# Patient Record
Sex: MALE | Race: ASIAN | Employment: FULL TIME
[De-identification: names, ages, dates, MRNs, and addresses within clinical notes are randomized per-mention and may not be internally consistent; named-entity substitution may affect disease eponyms.]

---

## 2019-10-01 ENCOUNTER — HEALTH MAINTENANCE LETTER (OUTPATIENT)
Age: 49
End: 2019-10-01

## 2021-01-15 ENCOUNTER — HEALTH MAINTENANCE LETTER (OUTPATIENT)
Age: 51
End: 2021-01-15

## 2021-02-17 VITALS
OXYGEN SATURATION: 96 % | HEART RATE: 100 BPM | WEIGHT: 150 LBS | TEMPERATURE: 98 F | BODY MASS INDEX: 25.35 KG/M2 | RESPIRATION RATE: 18 BRPM | SYSTOLIC BLOOD PRESSURE: 134 MMHG | DIASTOLIC BLOOD PRESSURE: 106 MMHG

## 2021-02-17 PROCEDURE — 99283 EMERGENCY DEPT VISIT LOW MDM: CPT | Mod: 25

## 2021-02-17 PROCEDURE — 12002 RPR S/N/AX/GEN/TRNK2.6-7.5CM: CPT

## 2021-02-18 ENCOUNTER — HOSPITAL ENCOUNTER (EMERGENCY)
Facility: CLINIC | Age: 51
Discharge: HOME | End: 2021-02-18
Attending: EMERGENCY MEDICINE | Admitting: EMERGENCY MEDICINE
Payer: COMMERCIAL

## 2021-02-18 DIAGNOSIS — S61.019A LACERATION OF THUMB, FOREIGN BODY PRESENCE UNSPECIFIED, NAIL DAMAGE STATUS UNSPECIFIED, UNSPECIFIED LATERALITY, INITIAL ENCOUNTER: ICD-10-CM

## 2021-02-18 PROCEDURE — 250N000013 HC RX MED GY IP 250 OP 250 PS 637: Performed by: EMERGENCY MEDICINE

## 2021-02-18 PROCEDURE — 90715 TDAP VACCINE 7 YRS/> IM: CPT | Performed by: EMERGENCY MEDICINE

## 2021-02-18 PROCEDURE — 90471 IMMUNIZATION ADMIN: CPT | Performed by: EMERGENCY MEDICINE

## 2021-02-18 PROCEDURE — 250N000011 HC RX IP 250 OP 636: Performed by: EMERGENCY MEDICINE

## 2021-02-18 RX ORDER — LIDOCAINE HYDROCHLORIDE 10 MG/ML
INJECTION, SOLUTION INFILTRATION; PERINEURAL
Status: DISCONTINUED
Start: 2021-02-18 | End: 2021-02-18 | Stop reason: HOSPADM

## 2021-02-18 RX ORDER — CEPHALEXIN 500 MG/1
500 CAPSULE ORAL 4 TIMES DAILY
Qty: 20 CAPSULE | Refills: 0 | Status: SHIPPED | OUTPATIENT
Start: 2021-02-18 | End: 2021-02-23

## 2021-02-18 RX ORDER — CEPHALEXIN 500 MG/1
500 CAPSULE ORAL ONCE
Status: COMPLETED | OUTPATIENT
Start: 2021-02-18 | End: 2021-02-18

## 2021-02-18 RX ADMIN — CEPHALEXIN 500 MG: 500 CAPSULE ORAL at 01:41

## 2021-02-18 RX ADMIN — CLOSTRIDIUM TETANI TOXOID ANTIGEN (FORMALDEHYDE INACTIVATED), CORYNEBACTERIUM DIPHTHERIAE TOXOID ANTIGEN (FORMALDEHYDE INACTIVATED), BORDETELLA PERTUSSIS TOXOID ANTIGEN (GLUTARALDEHYDE INACTIVATED), BORDETELLA PERTUSSIS FILAMENTOUS HEMAGGLUTININ ANTIGEN (FORMALDEHYDE INACTIVATED), BORDETELLA PERTUSSIS PERTACTIN ANTIGEN, AND BORDETELLA PERTUSSIS FIMBRIAE 2/3 ANTIGEN 0.5 ML: 5; 2; 2.5; 5; 3; 5 INJECTION, SUSPENSION INTRAMUSCULAR at 01:41

## 2021-02-18 ASSESSMENT — ENCOUNTER SYMPTOMS: WOUND: 1

## 2021-02-18 NOTE — ED TRIAGE NOTES
Pt arrives with a laceration to right thumb after sharpening knives tonight.  Bleeding controlled.  Denies blood thinners but admits to ETOH tonight

## 2021-02-18 NOTE — ED PROVIDER NOTES
History   Chief Complaint:  Laceration     HPI   Medhat Condon is a 50 year old male with history of hyperlipidemia who presents with a laceration. The patient reports around 2300 he was sharpening his kitchen knives and sliced his right thumb. Tetanus last updated in 2004.     Review of Systems   Skin: Positive for wound.   All other systems reviewed and are negative.    Allergies:  Bee Venom  Penicillin G    Medications:  Vyvanse     Past Medical History:    External hemorrhoids  Internal hemorrhoids  Hyperlipidemia       Social History:  The patient arrived to the ED Alone via private car.    Physical Exam     Patient Vitals for the past 24 hrs:   BP Temp Temp src Pulse Resp SpO2 Weight   02/17/21 2350 (!) 134/106 98  F (36.7  C) Temporal 100 18 96 % 68 kg (150 lb)     Physical Exam    Constitutional:  Well appearing.  MSK:   Brisk cap refill of right thumb. Full ROM  Neuro:   Strength and sensation fully intact.  Derm:   3.5 cm horizontal through fat pad of right thumb. With no tendon or deep structure involvement.     Emergency Department Course     Procedures    Laceration Repair        LACERATION:  A simple clean 3.5 cm laceration.      LOCATION:  Right fat pad of thumb      FUNCTION:  Distally sensation, circulation, motor and tendon function are intact.      ANESTHESIA:  Local using 1% lidocaine without epinephrine total of 2 mLs      PREPARATION:  Irrigation with Normal Saline and Shur Clens      DEBRIDEMENT:  no debridement and wound explored, no foreign body found      CLOSURE:  Wound was closed with One Layer.  Skin closed with 3 x 4.0 Ethylon using interrupted sutures.    Emergency Department Course:  Reviewed:  0051: I reviewed the patient's nursing notes, vitals and past medical history    Assessments:  0101: I performed an exam of the patient as documented above.     0142: I repaired the laceration as documented above.     Interventions:  0141 Adacel 0.5 mL IM     0141 Keflex 500 mg  PO    Disposition:  The patient was discharged to home.     Impression & Plan         Medical Decision Making:  Findings and exam were consistent with laceration of Right thumb, which was repaired as noted above.  There does appear to be a tendon injury. There is no evidence at this time of associated fracture or foreign body, deep space infection,  or neurovascular injury. Call ASAP to schedule follow up with orthopedics. The sutures will likely be removed after follow up with ortho, otherwise suture removal in 10-14 days. Indications for immediate return to ER/UR were reviewed and included but are not limited to, redness, fevers, drainage, increasing pain, high fevers, or other concerns. Prescriptions for Keflex were provided.    Diagnosis:    ICD-10-CM   1. Laceration of thumb, foreign body presence unspecified, nail damage status unspecified, unspecified laterality, initial encounter  S61.019A     Discharge Medications:  New Prescriptions    CEPHALEXIN (KEFLEX) 500 MG CAPSULE    Take 1 capsule (500 mg) by mouth 4 times daily for 5 days     Scribe Disclosure:  Prema NAVARRO, am serving as a scribe at 12:50 AM on 2/18/2021 to document services personally performed by Patrick Wright MD based on my observations and the provider's statements to me.      Patrick Wright MD  02/18/21 0411

## 2021-04-11 ENCOUNTER — HOSPITAL ENCOUNTER (EMERGENCY)
Facility: CLINIC | Age: 51
Discharge: HOME | End: 2021-04-11
Attending: PHYSICIAN ASSISTANT | Admitting: PHYSICIAN ASSISTANT
Payer: COMMERCIAL

## 2021-04-11 ENCOUNTER — APPOINTMENT (OUTPATIENT)
Dept: GENERAL RADIOLOGY | Facility: CLINIC | Age: 51
End: 2021-04-11
Attending: PHYSICIAN ASSISTANT
Payer: COMMERCIAL

## 2021-04-11 VITALS
RESPIRATION RATE: 16 BRPM | HEART RATE: 68 BPM | TEMPERATURE: 98.9 F | OXYGEN SATURATION: 98 % | DIASTOLIC BLOOD PRESSURE: 94 MMHG | SYSTOLIC BLOOD PRESSURE: 123 MMHG

## 2021-04-11 DIAGNOSIS — U07.1 2019 NOVEL CORONAVIRUS DISEASE (COVID-19): ICD-10-CM

## 2021-04-11 LAB
ANION GAP SERPL CALCULATED.3IONS-SCNC: 6 MMOL/L (ref 3–14)
BASOPHILS # BLD AUTO: 0 10E9/L (ref 0–0.2)
BASOPHILS NFR BLD AUTO: 0 %
BUN SERPL-MCNC: 14 MG/DL (ref 7–30)
CALCIUM SERPL-MCNC: 8.3 MG/DL (ref 8.5–10.1)
CHLORIDE SERPL-SCNC: 108 MMOL/L (ref 94–109)
CO2 SERPL-SCNC: 25 MMOL/L (ref 20–32)
CREAT SERPL-MCNC: 1 MG/DL (ref 0.66–1.25)
DIFFERENTIAL METHOD BLD: ABNORMAL
EOSINOPHIL # BLD AUTO: 0 10E9/L (ref 0–0.7)
EOSINOPHIL NFR BLD AUTO: 0 %
ERYTHROCYTE [DISTWIDTH] IN BLOOD BY AUTOMATED COUNT: 12.1 % (ref 10–15)
GFR SERPL CREATININE-BSD FRML MDRD: 88 ML/MIN/{1.73_M2}
GLUCOSE SERPL-MCNC: 85 MG/DL (ref 70–99)
HCT VFR BLD AUTO: 48.2 % (ref 40–53)
HGB BLD-MCNC: 16.8 G/DL (ref 13.3–17.7)
IMM GRANULOCYTES # BLD: 0 10E9/L (ref 0–0.4)
IMM GRANULOCYTES NFR BLD: 0.2 %
LYMPHOCYTES # BLD AUTO: 1 10E9/L (ref 0.8–5.3)
LYMPHOCYTES NFR BLD AUTO: 21.1 %
MCH RBC QN AUTO: 31.8 PG (ref 26.5–33)
MCHC RBC AUTO-ENTMCNC: 34.9 G/DL (ref 31.5–36.5)
MCV RBC AUTO: 91 FL (ref 78–100)
MONOCYTES # BLD AUTO: 0.3 10E9/L (ref 0–1.3)
MONOCYTES NFR BLD AUTO: 7 %
NEUTROPHILS # BLD AUTO: 3.3 10E9/L (ref 1.6–8.3)
NEUTROPHILS NFR BLD AUTO: 71.7 %
NRBC # BLD AUTO: 0 10*3/UL
NRBC BLD AUTO-RTO: 0 /100
PLATELET # BLD AUTO: 115 10E9/L (ref 150–450)
POTASSIUM SERPL-SCNC: 3.7 MMOL/L (ref 3.4–5.3)
RBC # BLD AUTO: 5.29 10E12/L (ref 4.4–5.9)
SODIUM SERPL-SCNC: 139 MMOL/L (ref 133–144)
TROPONIN I SERPL-MCNC: <0.015 UG/L (ref 0–0.04)
WBC # BLD AUTO: 4.6 10E9/L (ref 4–11)

## 2021-04-11 PROCEDURE — 96360 HYDRATION IV INFUSION INIT: CPT

## 2021-04-11 PROCEDURE — 84484 ASSAY OF TROPONIN QUANT: CPT | Performed by: PHYSICIAN ASSISTANT

## 2021-04-11 PROCEDURE — 93005 ELECTROCARDIOGRAM TRACING: CPT

## 2021-04-11 PROCEDURE — 71045 X-RAY EXAM CHEST 1 VIEW: CPT

## 2021-04-11 PROCEDURE — 80048 BASIC METABOLIC PNL TOTAL CA: CPT | Performed by: PHYSICIAN ASSISTANT

## 2021-04-11 PROCEDURE — 258N000003 HC RX IP 258 OP 636: Performed by: PHYSICIAN ASSISTANT

## 2021-04-11 PROCEDURE — 99285 EMERGENCY DEPT VISIT HI MDM: CPT | Mod: 25

## 2021-04-11 PROCEDURE — 85025 COMPLETE CBC W/AUTO DIFF WBC: CPT | Performed by: PHYSICIAN ASSISTANT

## 2021-04-11 RX ADMIN — SODIUM CHLORIDE 1000 ML: 9 INJECTION, SOLUTION INTRAVENOUS at 12:05

## 2021-04-11 ASSESSMENT — ENCOUNTER SYMPTOMS
MYALGIAS: 1
FATIGUE: 1
SORE THROAT: 1
APPETITE CHANGE: 1
VOMITING: 0
FEVER: 1

## 2021-04-11 NOTE — ED PROVIDER NOTES
History   Chief Complaint:  Covid Concern     HPI   Medhat Condon is a 50 year old male with history of hyperlipidemia who presents with multiple symptoms. He complains of fever myalgia, fatigue, and mild sore throat for the past 9 days. He had a rapid COVID-19 test 1 week ago, which was negative. He later got a send-out test 5 days ago that resulted 2 days ago and returned positive. He presents for ongoing concerns of fever and continued symptoms. He has also had decreased appetite, however, this was improved yesterday and he has been able to eat and drink. His fevers have typically ranged from 100.8-101, with a maximum temperature of 102 degrees. He denies any significant vomiting.     Review of Systems   Constitutional: Positive for appetite change, fatigue and fever.   HENT: Positive for sore throat.    Gastrointestinal: Negative for vomiting.   Musculoskeletal: Positive for myalgias.   All other systems reviewed and are negative.    Allergies:  Penicillin G    Medications:  Epinephrine  Vyvanse    Past Medical History:    External hemorrhoids  Internal hemorrhoids  Hyperlipidemia    Past Surgical History:    The patient denies past surgical history.      Family History:    The patient denies past family history.     Social History:  Presents alone to the ED  Denies drug use    Physical Exam     Patient Vitals for the past 24 hrs:   BP Temp Temp src Pulse Resp SpO2   04/11/21 1245 (!) 123/94 -- -- 68 -- 98 %   04/11/21 1230 (!) 128/97 -- -- 69 -- 99 %   04/11/21 1215 (!) 118/94 -- -- 65 -- 98 %   04/11/21 1156 (!) 128/96 -- -- 73 -- 99 %   04/11/21 1139 -- -- -- -- -- 100 %   04/11/21 1138 (!) 111/90 -- -- 68 -- --   04/11/21 1029 (!) 125/97 98.9  F (37.2  C) Temporal 82 16 95 %     Physical Exam  General: Resting comfortably.  Alert.  Head:  The scalp, face, and head appear normal   Eyes:  Conjunctivae and sclerae are normal    ENT:    The oropharynx is normal     Uvula is in the midline       Structures  are symmetric. No tonsillar hypertrophy or exudate.                Mucous membranes are moist.   Neck:  No lymphadenopathy   CV:  Regular rate and rhythm     Normal S1/S2   Resp:  Lungs are clear to auscultation    Non-labored    No rales or wheezing   MS:  Normal muscular tone   Skin:  No rash or acute skin lesions noted   Neuro: Speech is normal and fluent.       Emergency Department Course   ECG  ECG taken at 1152, ECG read at 1206  Normal sinus rhythm  Normal ECG  No prior for comparison   Rate 68 bpm. IA interval 146 ms. QRS duration 106 ms. QT/QTc 430/457 ms. P-R-T axes 51 10 26.     Imaging:  XR Chest Port 1 view   IMPRESSION: Portable chest. Lungs are clear. Heart is normal in size.   No pneumothorax. No definite pleural effusions  Reading per radiology    Laboratory:   CBC: WBC 4.6, HGB 16.8,  (L)   BMP: calcium 8.3 (L) o/w WNL (Creatinine 1.00)     Troponin (Collected 1202): <0.015    Emergency Department Course:  Reviewed:  I reviewed nursing notes, vitals, past medical history and care everywhere    Assessments:  1124 I obtained history and examined the patient as noted above.   1251 I rechecked the patient and explained findings.     Interventions:  1205 NS 1L IV Bolus    Disposition:  The patient was discharged to home.     Impression & Plan   Medical Decision Making:  Medhat Condon is a 50 year old male who presents for evaluation of a Covid concern.  Please for the HPI for full details. He was diagnosed with Covid at an outside facility several days ago. Symptoms started approximately 9 days ago. He was told to come in secondary to his niece being a nurse given his continued fevers and him not improving. Overall, the patient is well-appearing. Vital signs here are reassuring.There is no tachycardia. No hypoxia. There is no respiratory distress on exam. His exam is very reassuring. I do not feel a PE is likely given the patient denies any shortness of breath and his vital signs are very  reassuring here. I feel like D-dimer testing, or further investigation for PE is unnecessary, and I feel would be more harmful to the patient given possible administration of unnecessary radiation and therefore this cannot be obtained at this time. EKG is nonischemic.  Troponin is normal.  Basic labs here are reassuring. Chest x-ray is reassuring without any obvious pneumonia, or any other abnormality. Overall, believe the patient safe for discharge home.  He has remained vitally stable here in the ER. There is been no signs of hypoxia or any respiratory distress. No indication for hospitalization this time. Rather, I believe he is safe to discharge home with close follow-up with primary care doctor in 2 to 3 days for recheck.  Red flag symptoms, reasons to return were discussed and understood. He was also signed up for the get well loop. Red flag symptoms, reasons to return discussed understood.  All questions were answered prior to discharge. The patient understands and agrees with plan.    Covid-19  Medhat Condon was evaluated during a global COVID-19 pandemic, which necessitated consideration that the patient might be at risk for infection with the SARS-CoV-2 virus that causes COVID-19.   Applicable protocols for evaluation were followed during the patient's care. COVID-19 was considered as part of the patient's evaluation. The plan for testing is: a test was obtained at a previous visit and reviewed & considered today.    Diagnosis:    ICD-10-CM    1. 2019 novel coronavirus disease (COVID-19)  U07.1 COVID-19 GetWell Loop Referral     Scribe Disclosure:  I, Rebecca Young, am serving as a scribe at 11:23 AM on 4/11/2021 to document services personally performed by Asmita Titus PA based on my observations and the provider's statements to me.            Asmita Titus PA-C  04/11/21 2948

## 2021-04-11 NOTE — DISCHARGE INSTRUCTIONS
Discharge Instructions  COVID-19    COVID-19 is the disease caused by a new coronavirus. The virus spreads from person-to-person primarily by droplets when an infected person coughs or sneezes and the droplet either lands on another person or that other person touches a surface with the droplet on it. There are tests available to diagnose COVID-19. There is no specific treatment or medicine for the disease.    You may have been diagnosed with COVID, may be being tested for COVID and have a pending test result, or may have been exposed to COVID.    Symptoms of COVID-19    Many people have no symptoms or mild symptoms.  Symptoms may usually appear 4 to 5 days (up to 14 days) after contact with a person with COVID-19. Some people will get severe symptoms and pneumonia. Usual symptoms are:     ? Fever  ? Cough  ? Trouble breathing    Less common symptoms are: Headache, body aches, sore throat, sneezing, diarrhea, loss of taste or smell.    Isolation and Quarantine    You were seen because you have symptoms, had an exposure, or had some other concern about possible COVID. The best way to stop the spread of the virus is to avoid contact with others.  Isolation refers to sick people staying away from people who are not sick. A person in quarantine is limiting activity because they were exposed and are waiting to see if they might become sick.    If you test positive for COVID, you should stay home (isolation) for at least 10 days after your symptoms began, and for 24 hours with no fever and improvement of symptoms--whichever is longer. (Your fever should be gone for 24 hours without using fever-reducing medicine). If you have no symptoms, you should stay home (isolation) for 10 days from the day of the test.    For example, if you have a fever and cough for 6 days, you need to stay home 4 more days with no fever for a total of 10 days. Or, if you have a fever and cough for 10 days, you need to stay home one more day with  no fever for a total of 11 days.    If you have a high-risk exposure to COVID (you spent 15 minutes or more within six feet of somebody who has COVID), you should stay home (quarantine) for 14 days. Even if you test negative for COVID, the CDC recommends a 14-day quarantine from the time of your last exposure to that individual. There are options for a shortened (<14 day quarantine) you can review at:    https://www.health.[facility].[place]./diseases/coronavirus/close.html#long    If you have symptoms but a negative test, you should stay at home until you are symptom-free and without fever for 24 hours, using the same judgment you would for when it is safe to return to work/school from strep throat, influenza, or the common cold. If you worsen, you should consider being re-evaluated.    If you are being tested for COVID and your test is pending, you should stay home until you know your test result.    How should I protect myself and others?    Do not go to work or school. Have a friend or relative do your shopping. Do not use public transportation (bus, train) or ridesharing (Lyft, Uber).    Separate yourself from other people in your home. As much as possible, you should stay in one room and away from other people in your home. Also, use a separate bathroom, if possible. Avoid handling pets or other animals while sick.     Wear a facemask if you need to be around other people and cover your mouth and nose with a tissue when you cough or sneeze.     Avoid sharing personal household items. You should not share dishes, drinking glasses, forks/knives/spoons, towels, or bedding with other people in your home. After using these items, they should be washed with soap and water. Clean parts of your home that are touched often (doorknobs, faucets, countertops, etc.) daily.     Wash your hands often with soap and water for at least 20 seconds or use an alcohol-based hand  containing at least 60% alcohol.     Avoid touching  your face.    Treat your symptoms. You can take Acetaminophen (Tylenol) to treat body aches and fever as needed for comfort. Ibuprofen (Advil or Motrin) can be used as well if you still have symptoms after taking Tylenol. Drink fluids. Rest.    Watch for worsening symptoms such as shortness of breath/difficulty breathing or very severe weakness.    Employers/workplaces are being asked by the Centers for Disease Control (CDC) to not request notes/documentation for you to return to work or prove that you were ill. You may choose to show your employer this paperwork. Also, repeat testing should not be required to return to work.    Exercise/Sports in rare cases, COVID could affect your heart in a way that makes exercise or participation in sports dangerous.  If you have a mild COVID illness (fever, cough, sore throat, and similar symptoms but no difficulty breathing or abnormalities of the lung): After your COVID symptoms have resolved, wait 14-days before returning to activity.  If you have more than a mild illness (meaning that you have problems with your breathing or lungs) or if you participate in competitive or strenuous activity or have a history of heart disease: Please see your primary doctor/provider prior to return to activity/competition.    Return to the Emergency Department if:    If you are developing worsening breathing, shortness of breath, or feel worse you should seek medical attention.  If you are uncertain, contact your health care provider/clinic. If you need emergency medical attention, call 911 and tell them you have been ill.

## 2021-04-11 NOTE — ED TRIAGE NOTES
Pt is COVID positive. States that he is worsening SOB and generalized body aches. States that OTC medications are not helping. A+Ox4, no acute signs of distress.

## 2021-04-11 NOTE — ED NOTES
Respiratory - Respiratory WDL:  (pt comes in with SOB, worsening, cough. body aches and fatique. tested covid + and is using tylenol, ibuprofen and mucinex OTC and feels like its not helping, ) Breath Sounds: All Fields   Head To Toe Assessment - All Lung Fields Breath Sounds: Posterior:; clear     [place] Coma Scale - Best Eye Response: 4-->(E4) spontaneous  Best Motor Response: 6-->(M6) obeys commands  Best Verbal Response: 5-->(V5) oriented  [place] Coma Scale Score: 15

## 2021-04-12 ENCOUNTER — HOSPITAL ENCOUNTER (EMERGENCY)
Facility: CLINIC | Age: 51
Discharge: HOME | End: 2021-04-12
Attending: EMERGENCY MEDICINE | Admitting: EMERGENCY MEDICINE
Payer: COMMERCIAL

## 2021-04-12 VITALS
DIASTOLIC BLOOD PRESSURE: 78 MMHG | RESPIRATION RATE: 18 BRPM | HEART RATE: 72 BPM | SYSTOLIC BLOOD PRESSURE: 131 MMHG | TEMPERATURE: 98.7 F | OXYGEN SATURATION: 96 %

## 2021-04-12 DIAGNOSIS — M79.10 MYALGIA: ICD-10-CM

## 2021-04-12 DIAGNOSIS — U07.1 2019 NOVEL CORONAVIRUS DISEASE (COVID-19): ICD-10-CM

## 2021-04-12 LAB — INTERPRETATION ECG - MUSE: NORMAL

## 2021-04-12 PROCEDURE — 99282 EMERGENCY DEPT VISIT SF MDM: CPT

## 2021-04-12 RX ORDER — TRAMADOL HYDROCHLORIDE 50 MG/1
50 TABLET ORAL 2 TIMES DAILY PRN
Qty: 6 TABLET | Refills: 0 | Status: SHIPPED | OUTPATIENT
Start: 2021-04-12 | End: 2021-04-15

## 2021-04-12 ASSESSMENT — ENCOUNTER SYMPTOMS
MYALGIAS: 1
FEVER: 1
SHORTNESS OF BREATH: 0
COUGH: 1

## 2021-04-12 NOTE — ED PROVIDER NOTES
History   Chief Complaint:  Fever and Generalized Body Aches       HPI   Medhat Condon is a 50 year old male with history of hyperlipidemia who presents with COVID-19. The patient was seen in the emergency department yesterday (04/11), labs noted below, after nine days of viral symptoms and a positive COVID-19 test on 04/06. He returns today with complains of myalgias, cough, fever, and epigastric pain. He has a pulse oximeter at home, which he says has been normal. Patient has been taking Tylenol and Advil for fever with TMax of 103 at home. Denies any chest pain, leg swelling, or worsening shortness of breath.     [facility] Workup 04/11/2021  XR Chest Port 1 view   IMPRESSION: Portable chest. Lungs are clear. Heart is normal in size.   No pneumothorax. No definite pleural effusions  Reading per radiology    CBC: WBC 4.6, HGB 16.8,  (L)   BMP: calcium 8.3 (L) o/w WNL (Creatinine 1.00)   Troponin (Collected 1202): <0.015       Review of Systems   Constitutional: Positive for fever.   Respiratory: Positive for cough. Negative for shortness of breath.    Cardiovascular: Negative for chest pain and leg swelling.   Musculoskeletal: Positive for myalgias.   All other systems reviewed and are negative.        Allergies:  Bee venom   Penicillin     Medications:  Epinephrine     Past Medical History:    Hemorrhoids   Hyperlipidemia     Past Surgical History:    Colonoscopy     Social History:  Presents to emergency department alone    Physical Exam     Patient Vitals for the past 24 hrs:   BP Temp Temp src Pulse Resp SpO2   04/12/21 1832 131/78 98.7  F (37.1  C) Oral 72 -- 96 %   04/12/21 1713 118/84 96.6  F (35.9  C) Oral 91 18 97 %       Physical Exam  General:  No respiratory distress    Cardiovascular: Good cap refill.    Respiratory: Breathing non labored.     Musculoskeletal: No tenderness. No bony deformity. No abdominal tenderness.     Skin: No rashes or petechiae     Neurologic: non focal       Psychiatric: Appropriate      Emergency Department Course   Emergency Department Course:    Reviewed:  I reviewed nursing notes, vitals, past medical history and care everywhere    Assessments:  1823 I obtained history and examined the patient as noted above.     Disposition:  The patient was discharged to home.       Impression & Plan     Medical Decision Making:  The patient presented with a known diagnosis of Covid.  Was evaluated yesterday with blood work and studies.  I did not feel he required other studies today.  The patient did report he had some myalgias had some pain along the lower abdominal musculature.  I considered PE but it was not in the location I would expect was not pleuritic he had no tachycardia.  I did write him for pain medication due to significant discomfort stressed that he should isolate and he was discharged home in good condition.  He had no hypoxia and I do not think there was a cardiomyopathy or any impending respiratory compromise.    Covid-19  Medhat Condon was evaluated during a global COVID-19 pandemic, which necessitated consideration that the patient might be at risk for infection with the SARS-CoV-2 virus that causes COVID-19.   Applicable protocols for evaluation were followed during the patient's care.   COVID-19 was considered as part of the patient's evaluation. The plan for testing is:  a test was obtained at a previous visit and reviewed & considered today.    Diagnosis:    ICD-10-CM    1. 2019 novel coronavirus disease (COVID-19)  U07.1    2. Myalgia  M79.10        Discharge Medications:  Discharge Medication List as of 4/12/2021  6:56 PM      START taking these medications    Details   traMADol (ULTRAM) 50 MG tablet Take 1 tablet (50 mg) by mouth 2 times daily as needed for severe pain, Disp-6 tablet, R-0, Local Print             Scribe Disclosure:  RAMON, Shanika Sundraven, am serving as a scribe at 6:23 PM on 4/12/2021 to document services personally performed by  Geovanny Brambila MD based on my observations and the provider's statements to me.             Geovanny Brambila MD  04/13/21 0017

## 2021-04-12 NOTE — DISCHARGE INSTRUCTIONS
Discharge Instructions  COVID-19    COVID-19 is the disease caused by a new coronavirus. The virus spreads from person-to-person primarily by droplets when an infected person coughs or sneezes and the droplet either lands on another person or that other person touches a surface with the droplet on it. There are tests available to diagnose COVID-19. There is no specific treatment or medicine for the disease.    You may have been diagnosed with COVID, may be being tested for COVID and have a pending test result, or may have been exposed to COVID.    Symptoms of COVID-19    Many people have no symptoms or mild symptoms.  Symptoms may usually appear 4 to 5 days (up to 14 days) after contact with a person with COVID-19. Some people will get severe symptoms and pneumonia. Usual symptoms are:     ? Fever  ? Cough  ? Trouble breathing    Less common symptoms are: Headache, body aches, sore throat, sneezing, diarrhea, loss of taste or smell.    Isolation and Quarantine    You were seen because you have symptoms, had an exposure, or had some other concern about possible COVID. The best way to stop the spread of the virus is to avoid contact with others.  Isolation refers to sick people staying away from people who are not sick. A person in quarantine is limiting activity because they were exposed and are waiting to see if they might become sick.    If you test positive for COVID, you should stay home (isolation) for at least 10 days after your symptoms began, and for 24 hours with no fever and improvement of symptoms--whichever is longer. (Your fever should be gone for 24 hours without using fever-reducing medicine). If you have no symptoms, you should stay home (isolation) for 10 days from the day of the test.    For example, if you have a fever and cough for 6 days, you need to stay home 4 more days with no fever for a total of 10 days. Or, if you have a fever and cough for 10 days, you need to stay home one more day with  no fever for a total of 11 days.    If you have a high-risk exposure to COVID (you spent 15 minutes or more within six feet of somebody who has COVID), you should stay home (quarantine) for 14 days. Even if you test negative for COVID, the CDC recommends a 14-day quarantine from the time of your last exposure to that individual. There are options for a shortened (<14 day quarantine) you can review at:    https://www.[email or web address].[facility].[place]./diseases/coronavirus/close.html#long    If you have symptoms but a negative test, you should stay at home until you are symptom-free and without fever for 24 hours, using the same judgment you would for when it is safe to return to work/school from strep throat, influenza, or the common cold. If you worsen, you should consider being re-evaluated.    If you are being tested for COVID and your test is pending, you should stay home until you know your test result.    How should I protect myself and others?    Do not go to work or school. Have a friend or relative do your shopping. Do not use public transportation (bus, train) or ridesharing (Lyft, Uber).    Separate yourself from other people in your home. As much as possible, you should stay in one room and away from other people in your home. Also, use a separate bathroom, if possible. Avoid handling pets or other animals while sick.     Wear a facemask if you need to be around other people and cover your mouth and nose with a tissue when you cough or sneeze.     Avoid sharing personal household items. You should not share dishes, drinking glasses, forks/knives/spoons, towels, or bedding with other people in your home. After using these items, they should be washed with soap and water. Clean parts of your home that are touched often (doorknobs, faucets, countertops, etc.) daily.     Wash your hands often with soap and water for at least 20 seconds or use an alcohol-based hand  containing at least 60% alcohol.     Avoid touching  your face.    Treat your symptoms. You can take Acetaminophen (Tylenol) to treat body aches and fever as needed for comfort. Ibuprofen (Advil or Motrin) can be used as well if you still have symptoms after taking Tylenol. Drink fluids. Rest.    Watch for worsening symptoms such as shortness of breath/difficulty breathing or very severe weakness.    Employers/workplaces are being asked by the Centers for Disease Control (CDC) to not request notes/documentation for you to return to work or prove that you were ill. You may choose to show your employer this paperwork. Also, repeat testing should not be required to return to work.    Exercise/Sports in rare cases, COVID could affect your heart in a way that makes exercise or participation in sports dangerous.  If you have a mild COVID illness (fever, cough, sore throat, and similar symptoms but no difficulty breathing or abnormalities of the lung): After your COVID symptoms have resolved, wait 14-days before returning to activity.  If you have more than a mild illness (meaning that you have problems with your breathing or lungs) or if you participate in competitive or strenuous activity or have a history of heart disease: Please see your primary doctor/provider prior to return to activity/competition.    Return to the Emergency Department if:    If you are developing worsening breathing, shortness of breath, or feel worse you should seek medical attention.  If you are uncertain, contact your health care provider/clinic. If you need emergency medical attention, call 911 and tell them you have been ill.

## 2021-09-04 ENCOUNTER — HEALTH MAINTENANCE LETTER (OUTPATIENT)
Age: 51
End: 2021-09-04

## 2022-02-19 ENCOUNTER — HEALTH MAINTENANCE LETTER (OUTPATIENT)
Age: 52
End: 2022-02-19

## 2022-10-16 ENCOUNTER — HEALTH MAINTENANCE LETTER (OUTPATIENT)
Age: 52
End: 2022-10-16

## 2023-04-01 ENCOUNTER — HEALTH MAINTENANCE LETTER (OUTPATIENT)
Age: 53
End: 2023-04-01